# Patient Record
Sex: FEMALE | Race: WHITE | NOT HISPANIC OR LATINO | Employment: FULL TIME | URBAN - METROPOLITAN AREA
[De-identification: names, ages, dates, MRNs, and addresses within clinical notes are randomized per-mention and may not be internally consistent; named-entity substitution may affect disease eponyms.]

---

## 2018-04-30 ENCOUNTER — TRANSCRIBE ORDERS (OUTPATIENT)
Dept: ADMINISTRATIVE | Facility: HOSPITAL | Age: 48
End: 2018-04-30

## 2018-04-30 ENCOUNTER — HOSPITAL ENCOUNTER (OUTPATIENT)
Dept: RADIOLOGY | Facility: HOSPITAL | Age: 48
Discharge: HOME | End: 2018-04-30
Payer: COMMERCIAL

## 2018-04-30 DIAGNOSIS — R06.02 SHORTNESS OF BREATH: Primary | ICD-10-CM

## 2018-04-30 DIAGNOSIS — R06.02 SHORTNESS OF BREATH: ICD-10-CM

## 2018-04-30 PROCEDURE — 71046 X-RAY EXAM CHEST 2 VIEWS: CPT

## 2018-08-20 ENCOUNTER — TRANSCRIBE ORDERS (OUTPATIENT)
Dept: ADMINISTRATIVE | Facility: HOSPITAL | Age: 48
End: 2018-08-20

## 2018-08-20 DIAGNOSIS — Z12.39 SCREENING BREAST EXAMINATION: Primary | ICD-10-CM

## 2018-08-24 ENCOUNTER — HOSPITAL ENCOUNTER (OUTPATIENT)
Dept: BONE DENSITY | Facility: IMAGING CENTER | Age: 48
Discharge: HOME | End: 2018-08-24
Payer: COMMERCIAL

## 2018-08-24 DIAGNOSIS — Z12.39 SCREENING BREAST EXAMINATION: ICD-10-CM

## 2018-08-24 PROCEDURE — 77067 SCR MAMMO BI INCL CAD: CPT

## 2018-09-04 ENCOUNTER — TELEPHONE (OUTPATIENT)
Dept: MAMMOGRAPHY | Facility: CLINIC | Age: 48
End: 2018-09-04

## 2018-09-24 ENCOUNTER — HOSPITAL ENCOUNTER (OUTPATIENT)
Dept: MAMMOGRAPHY | Facility: CLINIC | Age: 48
Discharge: HOME | End: 2018-09-24
Payer: COMMERCIAL

## 2018-09-24 ENCOUNTER — HOSPITAL ENCOUNTER (OUTPATIENT)
Dept: ULTRASOUND IMAGING | Facility: CLINIC | Age: 48
Discharge: HOME | End: 2018-09-24
Payer: COMMERCIAL

## 2018-09-24 DIAGNOSIS — R92.8 ABNORMAL MAMMOGRAM: ICD-10-CM

## 2018-09-24 PROCEDURE — 77066 DX MAMMO INCL CAD BI: CPT

## 2018-09-24 PROCEDURE — G0279 TOMOSYNTHESIS, MAMMO: HCPCS

## 2018-09-24 PROCEDURE — 76642 ULTRASOUND BREAST LIMITED: CPT

## 2021-05-12 ENCOUNTER — IMMUNIZATIONS (OUTPATIENT)
Dept: FAMILY MEDICINE CLINIC | Facility: HOSPITAL | Age: 51
End: 2021-05-12

## 2021-05-12 DIAGNOSIS — Z23 ENCOUNTER FOR IMMUNIZATION: Primary | ICD-10-CM

## 2021-05-12 PROCEDURE — 0001A SARS-COV-2 / COVID-19 MRNA VACCINE (PFIZER-BIONTECH) 30 MCG: CPT

## 2021-05-12 PROCEDURE — 91300 SARS-COV-2 / COVID-19 MRNA VACCINE (PFIZER-BIONTECH) 30 MCG: CPT

## 2021-06-05 ENCOUNTER — IMMUNIZATIONS (OUTPATIENT)
Dept: FAMILY MEDICINE CLINIC | Facility: HOSPITAL | Age: 51
End: 2021-06-05

## 2021-06-05 DIAGNOSIS — Z23 ENCOUNTER FOR IMMUNIZATION: Primary | ICD-10-CM

## 2021-06-05 PROCEDURE — 91300 SARS-COV-2 / COVID-19 MRNA VACCINE (PFIZER-BIONTECH) 30 MCG: CPT | Performed by: ANESTHESIOLOGY

## 2021-06-05 PROCEDURE — 0002A SARS-COV-2 / COVID-19 MRNA VACCINE (PFIZER-BIONTECH) 30 MCG: CPT | Performed by: ANESTHESIOLOGY

## 2023-09-08 ENCOUNTER — HOSPITAL ENCOUNTER (INPATIENT)
Facility: HOSPITAL | Age: 53
LOS: 1 days | Discharge: HOME | End: 2023-09-10
Attending: EMERGENCY MEDICINE | Admitting: STUDENT IN AN ORGANIZED HEALTH CARE EDUCATION/TRAINING PROGRAM
Payer: COMMERCIAL

## 2023-09-08 ENCOUNTER — APPOINTMENT (EMERGENCY)
Dept: CT IMAGING | Facility: HOSPITAL | Age: 53
End: 2023-09-08
Payer: COMMERCIAL

## 2023-09-08 DIAGNOSIS — R65.21 SEPTIC SHOCK (HCC): Primary | ICD-10-CM

## 2023-09-08 DIAGNOSIS — R39.89 SUSPECTED UTI: ICD-10-CM

## 2023-09-08 DIAGNOSIS — A41.9 SEPTIC SHOCK (HCC): Primary | ICD-10-CM

## 2023-09-08 LAB
ALBUMIN SERPL BCP-MCNC: 4.2 G/DL (ref 3.5–5)
ALP SERPL-CCNC: 47 U/L (ref 34–104)
ALT SERPL W P-5'-P-CCNC: 24 U/L (ref 7–52)
ANION GAP SERPL CALCULATED.3IONS-SCNC: 10 MMOL/L
APTT PPP: 30 SECONDS (ref 23–37)
AST SERPL W P-5'-P-CCNC: 26 U/L (ref 13–39)
BACTERIA UR QL AUTO: ABNORMAL /HPF
BACTERIA UR QL AUTO: ABNORMAL /HPF
BASOPHILS # BLD MANUAL: 0 THOUSAND/UL (ref 0–0.1)
BASOPHILS NFR MAR MANUAL: 0 % (ref 0–1)
BILIRUB SERPL-MCNC: 0.98 MG/DL (ref 0.2–1)
BILIRUB UR QL STRIP: NEGATIVE
BILIRUB UR QL STRIP: NEGATIVE
BUN SERPL-MCNC: 15 MG/DL (ref 5–25)
CALCIUM SERPL-MCNC: 9.2 MG/DL (ref 8.4–10.2)
CHLORIDE SERPL-SCNC: 97 MMOL/L (ref 96–108)
CLARITY UR: ABNORMAL
CLARITY UR: CLEAR
CO2 SERPL-SCNC: 24 MMOL/L (ref 21–32)
COLOR UR: ABNORMAL
COLOR UR: ABNORMAL
CREAT SERPL-MCNC: 1.23 MG/DL (ref 0.6–1.3)
EOSINOPHIL # BLD MANUAL: 0 THOUSAND/UL (ref 0–0.4)
EOSINOPHIL NFR BLD MANUAL: 0 % (ref 0–6)
ERYTHROCYTE [DISTWIDTH] IN BLOOD BY AUTOMATED COUNT: 12.1 % (ref 11.6–15.1)
FLUAV RNA RESP QL NAA+PROBE: NEGATIVE
FLUBV RNA RESP QL NAA+PROBE: NEGATIVE
GFR SERPL CREATININE-BSD FRML MDRD: 49 ML/MIN/1.73SQ M
GLUCOSE SERPL-MCNC: 138 MG/DL (ref 65–140)
GLUCOSE UR STRIP-MCNC: NEGATIVE MG/DL
GLUCOSE UR STRIP-MCNC: NEGATIVE MG/DL
HCT VFR BLD AUTO: 41.2 % (ref 34.8–46.1)
HGB BLD-MCNC: 14.4 G/DL (ref 11.5–15.4)
HGB UR QL STRIP.AUTO: NEGATIVE
HGB UR QL STRIP.AUTO: NEGATIVE
HYALINE CASTS #/AREA URNS LPF: ABNORMAL /LPF
HYALINE CASTS #/AREA URNS LPF: ABNORMAL /LPF
INR PPP: 1.04 (ref 0.84–1.19)
KETONES UR STRIP-MCNC: ABNORMAL MG/DL
KETONES UR STRIP-MCNC: ABNORMAL MG/DL
LACTATE SERPL-SCNC: 4 MMOL/L (ref 0.5–2)
LEUKOCYTE ESTERASE UR QL STRIP: ABNORMAL
LEUKOCYTE ESTERASE UR QL STRIP: ABNORMAL
LYMPHOCYTES # BLD AUTO: 0 % (ref 14–44)
LYMPHOCYTES # BLD AUTO: 0 THOUSAND/UL (ref 0.6–4.47)
MCH RBC QN AUTO: 30.4 PG (ref 26.8–34.3)
MCHC RBC AUTO-ENTMCNC: 35 G/DL (ref 31.4–37.4)
MCV RBC AUTO: 87 FL (ref 82–98)
METAMYELOCYTES NFR BLD MANUAL: 1 % (ref 0–1)
MONOCYTES # BLD AUTO: 0.38 THOUSAND/UL (ref 0–1.22)
MONOCYTES NFR BLD: 3 % (ref 4–12)
MUCOUS THREADS UR QL AUTO: ABNORMAL
MUCOUS THREADS UR QL AUTO: ABNORMAL
NEUTROPHILS # BLD MANUAL: 12.03 THOUSAND/UL (ref 1.85–7.62)
NEUTS BAND NFR BLD MANUAL: 6 % (ref 0–8)
NEUTS SEG NFR BLD AUTO: 90 % (ref 43–75)
NITRITE UR QL STRIP: NEGATIVE
NITRITE UR QL STRIP: NEGATIVE
NON-SQ EPI CELLS URNS QL MICRO: ABNORMAL /HPF
NON-SQ EPI CELLS URNS QL MICRO: ABNORMAL /HPF
PH UR STRIP.AUTO: 5.5 [PH]
PH UR STRIP.AUTO: 8 [PH]
PLATELET # BLD AUTO: 202 THOUSANDS/UL (ref 149–390)
PLATELET BLD QL SMEAR: ADEQUATE
PMV BLD AUTO: 9.7 FL (ref 8.9–12.7)
POTASSIUM SERPL-SCNC: 3.6 MMOL/L (ref 3.5–5.3)
PROCALCITONIN SERPL-MCNC: 18.29 NG/ML
PROT SERPL-MCNC: 6.8 G/DL (ref 6.4–8.4)
PROT UR STRIP-MCNC: ABNORMAL MG/DL
PROT UR STRIP-MCNC: ABNORMAL MG/DL
PROTHROMBIN TIME: 14.3 SECONDS (ref 11.6–14.5)
RBC # BLD AUTO: 4.74 MILLION/UL (ref 3.81–5.12)
RBC #/AREA URNS AUTO: ABNORMAL /HPF
RBC #/AREA URNS AUTO: ABNORMAL /HPF
RBC MORPH BLD: NORMAL
RSV RNA RESP QL NAA+PROBE: NEGATIVE
SARS-COV-2 RNA RESP QL NAA+PROBE: NEGATIVE
SODIUM SERPL-SCNC: 131 MMOL/L (ref 135–147)
SP GR UR STRIP.AUTO: 1.01 (ref 1–1.03)
SP GR UR STRIP.AUTO: >=1.03 (ref 1–1.03)
UROBILINOGEN UR STRIP-ACNC: <2 MG/DL
UROBILINOGEN UR STRIP-ACNC: <2 MG/DL
WBC # BLD AUTO: 12.53 THOUSAND/UL (ref 4.31–10.16)
WBC #/AREA URNS AUTO: ABNORMAL /HPF
WBC #/AREA URNS AUTO: ABNORMAL /HPF

## 2023-09-08 PROCEDURE — 74177 CT ABD & PELVIS W/CONTRAST: CPT

## 2023-09-08 PROCEDURE — 87086 URINE CULTURE/COLONY COUNT: CPT | Performed by: PHYSICIAN ASSISTANT

## 2023-09-08 PROCEDURE — 36415 COLL VENOUS BLD VENIPUNCTURE: CPT | Performed by: EMERGENCY MEDICINE

## 2023-09-08 PROCEDURE — 96365 THER/PROPH/DIAG IV INF INIT: CPT

## 2023-09-08 PROCEDURE — 85730 THROMBOPLASTIN TIME PARTIAL: CPT | Performed by: EMERGENCY MEDICINE

## 2023-09-08 PROCEDURE — G1004 CDSM NDSC: HCPCS

## 2023-09-08 PROCEDURE — 84145 PROCALCITONIN (PCT): CPT | Performed by: EMERGENCY MEDICINE

## 2023-09-08 PROCEDURE — 83605 ASSAY OF LACTIC ACID: CPT | Performed by: EMERGENCY MEDICINE

## 2023-09-08 PROCEDURE — 87040 BLOOD CULTURE FOR BACTERIA: CPT | Performed by: EMERGENCY MEDICINE

## 2023-09-08 PROCEDURE — 0241U HB NFCT DS VIR RESP RNA 4 TRGT: CPT | Performed by: EMERGENCY MEDICINE

## 2023-09-08 PROCEDURE — 99285 EMERGENCY DEPT VISIT HI MDM: CPT

## 2023-09-08 PROCEDURE — 81001 URINALYSIS AUTO W/SCOPE: CPT | Performed by: EMERGENCY MEDICINE

## 2023-09-08 PROCEDURE — 85610 PROTHROMBIN TIME: CPT | Performed by: EMERGENCY MEDICINE

## 2023-09-08 PROCEDURE — 96368 THER/DIAG CONCURRENT INF: CPT

## 2023-09-08 PROCEDURE — 85027 COMPLETE CBC AUTOMATED: CPT | Performed by: EMERGENCY MEDICINE

## 2023-09-08 PROCEDURE — 80053 COMPREHEN METABOLIC PANEL: CPT | Performed by: EMERGENCY MEDICINE

## 2023-09-08 PROCEDURE — 85007 BL SMEAR W/DIFF WBC COUNT: CPT | Performed by: EMERGENCY MEDICINE

## 2023-09-08 PROCEDURE — 99285 EMERGENCY DEPT VISIT HI MDM: CPT | Performed by: EMERGENCY MEDICINE

## 2023-09-08 RX ORDER — SODIUM CHLORIDE, SODIUM GLUCONATE, SODIUM ACETATE, POTASSIUM CHLORIDE, MAGNESIUM CHLORIDE, SODIUM PHOSPHATE, DIBASIC, AND POTASSIUM PHOSPHATE .53; .5; .37; .037; .03; .012; .00082 G/100ML; G/100ML; G/100ML; G/100ML; G/100ML; G/100ML; G/100ML
1000 INJECTION, SOLUTION INTRAVENOUS ONCE
Status: COMPLETED | OUTPATIENT
Start: 2023-09-08 | End: 2023-09-08

## 2023-09-08 RX ORDER — CEFTRIAXONE 2 G/50ML
2000 INJECTION, SOLUTION INTRAVENOUS ONCE
Status: COMPLETED | OUTPATIENT
Start: 2023-09-08 | End: 2023-09-08

## 2023-09-08 RX ADMIN — SODIUM CHLORIDE, SODIUM GLUCONATE, SODIUM ACETATE, POTASSIUM CHLORIDE, MAGNESIUM CHLORIDE, SODIUM PHOSPHATE, DIBASIC, AND POTASSIUM PHOSPHATE 1000 ML: .53; .5; .37; .037; .03; .012; .00082 INJECTION, SOLUTION INTRAVENOUS at 22:29

## 2023-09-08 RX ADMIN — SODIUM CHLORIDE, SODIUM GLUCONATE, SODIUM ACETATE, POTASSIUM CHLORIDE, MAGNESIUM CHLORIDE, SODIUM PHOSPHATE, DIBASIC, AND POTASSIUM PHOSPHATE 1000 ML: .53; .5; .37; .037; .03; .012; .00082 INJECTION, SOLUTION INTRAVENOUS at 22:01

## 2023-09-08 RX ADMIN — CEFTRIAXONE 2000 MG: 2 INJECTION, SOLUTION INTRAVENOUS at 21:51

## 2023-09-08 RX ADMIN — IOHEXOL 100 ML: 350 INJECTION, SOLUTION INTRAVENOUS at 23:37

## 2023-09-09 ENCOUNTER — APPOINTMENT (EMERGENCY)
Dept: RADIOLOGY | Facility: HOSPITAL | Age: 53
End: 2023-09-09
Payer: COMMERCIAL

## 2023-09-09 PROBLEM — E87.1 HYPONATREMIA: Status: ACTIVE | Noted: 2023-09-09

## 2023-09-09 PROBLEM — N20.1 URETEROLITHIASIS: Status: ACTIVE | Noted: 2023-09-09

## 2023-09-09 PROBLEM — A41.9 SEPSIS (HCC): Status: ACTIVE | Noted: 2023-09-09

## 2023-09-09 PROBLEM — R65.21 SEPTIC SHOCK (HCC): Status: ACTIVE | Noted: 2023-09-09

## 2023-09-09 LAB
ALBUMIN SERPL BCP-MCNC: 3.4 G/DL (ref 3.5–5)
ALP SERPL-CCNC: 42 U/L (ref 34–104)
ALT SERPL W P-5'-P-CCNC: 30 U/L (ref 7–52)
ANION GAP SERPL CALCULATED.3IONS-SCNC: 6 MMOL/L
AST SERPL W P-5'-P-CCNC: 35 U/L (ref 13–39)
BASOPHILS # BLD MANUAL: 0 THOUSAND/UL (ref 0–0.1)
BASOPHILS NFR MAR MANUAL: 0 % (ref 0–1)
BILIRUB SERPL-MCNC: 0.8 MG/DL (ref 0.2–1)
BUN SERPL-MCNC: 12 MG/DL (ref 5–25)
CALCIUM ALBUM COR SERPL-MCNC: 8.4 MG/DL (ref 8.3–10.1)
CALCIUM SERPL-MCNC: 7.9 MG/DL (ref 8.4–10.2)
CHLORIDE SERPL-SCNC: 104 MMOL/L (ref 96–108)
CO2 SERPL-SCNC: 24 MMOL/L (ref 21–32)
CREAT SERPL-MCNC: 1 MG/DL (ref 0.6–1.3)
EOSINOPHIL # BLD MANUAL: 0 THOUSAND/UL (ref 0–0.4)
EOSINOPHIL NFR BLD MANUAL: 0 % (ref 0–6)
ERYTHROCYTE [DISTWIDTH] IN BLOOD BY AUTOMATED COUNT: 12.3 % (ref 11.6–15.1)
GFR SERPL CREATININE-BSD FRML MDRD: 64 ML/MIN/1.73SQ M
GLUCOSE SERPL-MCNC: 105 MG/DL (ref 65–140)
HCT VFR BLD AUTO: 34.5 % (ref 34.8–46.1)
HGB BLD-MCNC: 12.1 G/DL (ref 11.5–15.4)
LACTATE SERPL-SCNC: 0.7 MMOL/L (ref 0.5–2)
LACTATE SERPL-SCNC: 2.1 MMOL/L (ref 0.5–2)
LYMPHOCYTES # BLD AUTO: 0.1 THOUSAND/UL (ref 0.6–4.47)
LYMPHOCYTES # BLD AUTO: 1 % (ref 14–44)
MAGNESIUM SERPL-MCNC: 2.2 MG/DL (ref 1.9–2.7)
MCH RBC QN AUTO: 30.8 PG (ref 26.8–34.3)
MCHC RBC AUTO-ENTMCNC: 35.1 G/DL (ref 31.4–37.4)
MCV RBC AUTO: 88 FL (ref 82–98)
MONOCYTES # BLD AUTO: 0.19 THOUSAND/UL (ref 0–1.22)
MONOCYTES NFR BLD: 2 % (ref 4–12)
NEUTROPHILS # BLD MANUAL: 9.29 THOUSAND/UL (ref 1.85–7.62)
NEUTS BAND NFR BLD MANUAL: 7 % (ref 0–8)
NEUTS SEG NFR BLD AUTO: 90 % (ref 43–75)
PHOSPHATE SERPL-MCNC: 3 MG/DL (ref 2.7–4.5)
PLATELET # BLD AUTO: 173 THOUSANDS/UL (ref 149–390)
PLATELET BLD QL SMEAR: ADEQUATE
PMV BLD AUTO: 10.2 FL (ref 8.9–12.7)
POTASSIUM SERPL-SCNC: 3.6 MMOL/L (ref 3.5–5.3)
PROCALCITONIN SERPL-MCNC: 15.53 NG/ML
PROT SERPL-MCNC: 5.5 G/DL (ref 6.4–8.4)
RBC # BLD AUTO: 3.93 MILLION/UL (ref 3.81–5.12)
RBC MORPH BLD: NORMAL
SODIUM SERPL-SCNC: 134 MMOL/L (ref 135–147)
WBC # BLD AUTO: 9.58 THOUSAND/UL (ref 4.31–10.16)

## 2023-09-09 PROCEDURE — 83735 ASSAY OF MAGNESIUM: CPT | Performed by: PHYSICIAN ASSISTANT

## 2023-09-09 PROCEDURE — 71046 X-RAY EXAM CHEST 2 VIEWS: CPT

## 2023-09-09 PROCEDURE — 83605 ASSAY OF LACTIC ACID: CPT | Performed by: PHYSICIAN ASSISTANT

## 2023-09-09 PROCEDURE — 84145 PROCALCITONIN (PCT): CPT | Performed by: PHYSICIAN ASSISTANT

## 2023-09-09 PROCEDURE — 84100 ASSAY OF PHOSPHORUS: CPT | Performed by: PHYSICIAN ASSISTANT

## 2023-09-09 PROCEDURE — 85007 BL SMEAR W/DIFF WBC COUNT: CPT | Performed by: PHYSICIAN ASSISTANT

## 2023-09-09 PROCEDURE — 99222 1ST HOSP IP/OBS MODERATE 55: CPT | Performed by: INTERNAL MEDICINE

## 2023-09-09 PROCEDURE — 80053 COMPREHEN METABOLIC PANEL: CPT | Performed by: PHYSICIAN ASSISTANT

## 2023-09-09 PROCEDURE — 85027 COMPLETE CBC AUTOMATED: CPT | Performed by: PHYSICIAN ASSISTANT

## 2023-09-09 RX ORDER — ONDANSETRON 2 MG/ML
4 INJECTION INTRAMUSCULAR; INTRAVENOUS EVERY 4 HOURS PRN
Status: DISCONTINUED | OUTPATIENT
Start: 2023-09-09 | End: 2023-09-10 | Stop reason: HOSPADM

## 2023-09-09 RX ORDER — MAGNESIUM HYDROXIDE/ALUMINUM HYDROXICE/SIMETHICONE 120; 1200; 1200 MG/30ML; MG/30ML; MG/30ML
30 SUSPENSION ORAL EVERY 6 HOURS PRN
Status: DISCONTINUED | OUTPATIENT
Start: 2023-09-09 | End: 2023-09-10 | Stop reason: HOSPADM

## 2023-09-09 RX ORDER — SODIUM CHLORIDE, SODIUM GLUCONATE, SODIUM ACETATE, POTASSIUM CHLORIDE, MAGNESIUM CHLORIDE, SODIUM PHOSPHATE, DIBASIC, AND POTASSIUM PHOSPHATE .53; .5; .37; .037; .03; .012; .00082 G/100ML; G/100ML; G/100ML; G/100ML; G/100ML; G/100ML; G/100ML
125 INJECTION, SOLUTION INTRAVENOUS CONTINUOUS
Status: DISCONTINUED | OUTPATIENT
Start: 2023-09-09 | End: 2023-09-10

## 2023-09-09 RX ORDER — IBUPROFEN 400 MG/1
400 TABLET ORAL EVERY 8 HOURS PRN
Status: DISCONTINUED | OUTPATIENT
Start: 2023-09-09 | End: 2023-09-09

## 2023-09-09 RX ORDER — ACETAMINOPHEN 325 MG/1
650 TABLET ORAL EVERY 6 HOURS PRN
Status: DISCONTINUED | OUTPATIENT
Start: 2023-09-09 | End: 2023-09-10 | Stop reason: HOSPADM

## 2023-09-09 RX ORDER — ENOXAPARIN SODIUM 100 MG/ML
40 INJECTION SUBCUTANEOUS DAILY
Status: DISCONTINUED | OUTPATIENT
Start: 2023-09-09 | End: 2023-09-10 | Stop reason: HOSPADM

## 2023-09-09 RX ORDER — DOCUSATE SODIUM 100 MG/1
100 CAPSULE, LIQUID FILLED ORAL 2 TIMES DAILY PRN
Status: DISCONTINUED | OUTPATIENT
Start: 2023-09-09 | End: 2023-09-10 | Stop reason: HOSPADM

## 2023-09-09 RX ORDER — CEFTRIAXONE 1 G/50ML
1000 INJECTION, SOLUTION INTRAVENOUS EVERY 24 HOURS
Status: DISCONTINUED | OUTPATIENT
Start: 2023-09-09 | End: 2023-09-10 | Stop reason: HOSPADM

## 2023-09-09 RX ADMIN — ACETAMINOPHEN 650 MG: 325 TABLET, FILM COATED ORAL at 11:44

## 2023-09-09 RX ADMIN — SODIUM CHLORIDE, SODIUM GLUCONATE, SODIUM ACETATE, POTASSIUM CHLORIDE, MAGNESIUM CHLORIDE, SODIUM PHOSPHATE, DIBASIC, AND POTASSIUM PHOSPHATE 125 ML/HR: .53; .5; .37; .037; .03; .012; .00082 INJECTION, SOLUTION INTRAVENOUS at 17:37

## 2023-09-09 RX ADMIN — SODIUM CHLORIDE, SODIUM GLUCONATE, SODIUM ACETATE, POTASSIUM CHLORIDE, MAGNESIUM CHLORIDE, SODIUM PHOSPHATE, DIBASIC, AND POTASSIUM PHOSPHATE 125 ML/HR: .53; .5; .37; .037; .03; .012; .00082 INJECTION, SOLUTION INTRAVENOUS at 01:45

## 2023-09-09 RX ADMIN — ENOXAPARIN SODIUM 40 MG: 100 INJECTION SUBCUTANEOUS at 07:41

## 2023-09-09 RX ADMIN — ACETAMINOPHEN 650 MG: 325 TABLET, FILM COATED ORAL at 17:38

## 2023-09-09 RX ADMIN — CEFTRIAXONE 1000 MG: 1 INJECTION, SOLUTION INTRAVENOUS at 21:06

## 2023-09-09 RX ADMIN — IBUPROFEN 400 MG: 400 TABLET, FILM COATED ORAL at 07:41

## 2023-09-09 RX ADMIN — ACETAMINOPHEN 650 MG: 325 TABLET, FILM COATED ORAL at 01:42

## 2023-09-09 NOTE — PLAN OF CARE
Problem: GENITOURINARY - ADULT  Goal: Maintains or returns to baseline urinary function  Description: INTERVENTIONS:  - Assess urinary function  - Encourage oral fluids to ensure adequate hydration if ordered  - Administer IV fluids as ordered to ensure adequate hydration  - Administer ordered medications as needed  - Offer frequent toileting  - Follow urinary retention protocol if ordered  Outcome: Progressing  Goal: Absence of urinary retention  Description: INTERVENTIONS:  - Assess patient’s ability to void and empty bladder  - Monitor I/O  - Bladder scan as needed  - Discuss with physician/AP medications to alleviate retention as needed  - Discuss catheterization for long term situations as appropriate  Outcome: Progressing     Problem: PAIN - ADULT  Goal: Verbalizes/displays adequate comfort level or baseline comfort level  Description: Interventions:  - Encourage patient to monitor pain and request assistance  - Assess pain using appropriate pain scale  - Administer analgesics based on type and severity of pain and evaluate response  - Implement non-pharmacological measures as appropriate and evaluate response  - Consider cultural and social influences on pain and pain management  - Notify physician/advanced practitioner if interventions unsuccessful or patient reports new pain  Outcome: Progressing     Problem: INFECTION - ADULT  Goal: Absence or prevention of progression during hospitalization  Description: INTERVENTIONS:  - Assess and monitor for signs and symptoms of infection  - Monitor lab/diagnostic results  - Monitor all insertion sites, i.e. indwelling lines, tubes, and drains  - Monitor endotracheal if appropriate and nasal secretions for changes in amount and color  - [place] appropriate cooling/warming therapies per order  - Administer medications as ordered  - Instruct and encourage patient and family to use good hand hygiene technique  - Identify and instruct in appropriate isolation precautions for identified infection/condition  Outcome: Progressing  Goal: Absence of fever/infection during neutropenic period  Description: INTERVENTIONS:  - Monitor WBC    Outcome: Progressing     Problem: DISCHARGE PLANNING  Goal: Discharge to home or other facility with appropriate resources  Description: INTERVENTIONS:  - Identify barriers to discharge w/patient and caregiver  - Arrange for needed discharge resources and transportation as appropriate  - Identify discharge learning needs (meds, wound care, etc.)  - Arrange for interpretive services to assist at discharge as needed  - Refer to Case Management Department for coordinating discharge planning if the patient needs post-hospital services based on physician/advanced practitioner order or complex needs related to functional status, cognitive ability, or social support system  Outcome: Progressing     Problem: Knowledge Deficit  Goal: Patient/family/caregiver demonstrates understanding of disease process, treatment plan, medications, and discharge instructions  Description: Complete learning assessment and assess knowledge base.   Interventions:  - Provide teaching at level of understanding  - Provide teaching via preferred learning methods  Outcome: Progressing

## 2023-09-09 NOTE — ASSESSMENT & PLAN NOTE
· Presenting with fevers, diaphoresis, and lower abdominal and back pain  · SIRS criteria met on admission: Tachycardia and leukocytosis  · Unclear source at this time, as UA without infection, however CT A/P does show evidence of likely recently passed stone  · High suspicion for ureterolithiasis with component of infection that passed prior to CT scan as source of infection  · Procalcitonin 18.29  · Septic shock criteria met with lactic acid at 4.0 -significant improvement status post 30 cc/kg IVF bolus to 2.1  · Continue ceftriaxone  · Send urine for culture  · Blood cultures x2, pending  · If urine culture is negative and blood cultures positive, will consult ID and obtain echocardiogram to rule out endocarditis -patient without murmur on exam

## 2023-09-09 NOTE — ASSESSMENT & PLAN NOTE
· Sodium 131 on admission  · Suspect secondary to hypovolemia in setting of fevers, as well as episode of vomiting and diarrhea earlier  · Continue IV fluids

## 2023-09-09 NOTE — SEPSIS NOTE
Sepsis Note   Yuliya Faria 46 y.o. female MRN: [id number]  Unit/Bed#: -01 Encounter: [id number]          Default Flowsheet Data (last 5 hours)     Sepsis Reassess     Row Name 09/09/23 0409                   Repeat Volume Status and Tissue Perfusion Assessment Performed    Date of Reassessment: 09/09/23  -NS        Time of Reassessment: 0222 -NS        Sepsis Reassessment Note: Click "NEXT" below (NOT "close") to generate sepsis reassessment note. --        Repeat Volume Status and Tissue Perfusion Assessment Performed --              User Key  (r) = Recorded By, (t) = Taken By, (c) = Cosigned By    [address] [place] [address] Name Provider Type    NS Jaja Judd PA-C Physician Assistant                Body mass index is 23.17 kg/m². Wt Readings from Last 1 Encounters:   09/08/23 61.2 kg (135 lb)     IBW (Ideal Body Weight): 54.7 kg    Ideal body weight: 54.7 kg (120 lb 9.5 oz)  Adjusted ideal body weight: 57.3 kg (126 lb 5.7 oz)     Please see H&P from 0222 for sepsis re-assessment vitals and physical exam. q15 minute blood pressure x1 hour showed stable BP without hypotension. IVF were given due to lactic acidosis.

## 2023-09-09 NOTE — ED PROVIDER NOTES
History  Chief Complaint   Patient presents with   • Possible UTI     Pt having UTI symptoms for a few days. Pt states she has back. Pt denies fevers     79-year-old female with history of prior UTI complains of dysuria today. She has had subjective fever and chills with diaphoresis. She had brief right flank pain earlier that has resolved. She took 1 leftover Bactrim today. She used Bactrim in the past for UTI. Denies nasal congestion, sore throat, cough, abdominal pain, nausea, vomiting and diarrhea. She did give us a urine specimen but it is contaminated. Due to her tachycardia, diaphoresis, subjective fever I will have patient repeat a good midstream urine and draw sepsis labs. Prior to Admission Medications   Prescriptions Last Dose Informant Patient Reported? Taking?   loratadine-pseudoephedrine (CLARITIN-D 12-HOUR) 5-120 mg per tablet 9/8/2023  Yes Yes   Sig: Take 1 tablet by mouth 2 (two) times a day      Facility-Administered Medications: None       No past medical history on file. No past surgical history on file. No family history on file. I have reviewed and agree with the history as documented. E-Cigarette/Vaping     E-Cigarette/Vaping Substances     Social History     Tobacco Use   • Smoking status: Never   • Smokeless tobacco: Never   Substance Use Topics   • Alcohol use: Yes   • Drug use: Not Currently       Review of Systems   Constitutional: Positive for diaphoresis and fever. Negative for activity change and appetite change. HENT: Negative for congestion, ear pain, rhinorrhea and sore throat. Respiratory: Negative for cough and shortness of breath. Cardiovascular: Negative for chest pain and palpitations. Gastrointestinal: Negative for abdominal pain, blood in stool, constipation, diarrhea, nausea and vomiting. Genitourinary: Positive for difficulty urinating, dysuria and flank pain ( Transient today).  Negative for decreased urine volume, hematuria and vaginal bleeding. Musculoskeletal: Negative for myalgias. Neurological: Negative for light-headedness and headaches. Physical Exam  Physical Exam  Vitals and nursing note reviewed. Constitutional:       General: She is not in acute distress. Appearance: She is well-developed and normal weight. She is not ill-appearing or diaphoretic. HENT:      Head: Normocephalic and atraumatic. Right Ear: External ear normal.   Eyes:      Conjunctiva/sclera: Conjunctivae normal.      Pupils: Pupils are equal, round, and reactive to light. Cardiovascular:      Rate and Rhythm: Normal rate and regular rhythm. Heart sounds: No murmur heard. Pulmonary:      Effort: Pulmonary effort is normal.      Breath sounds: Normal breath sounds. Abdominal:      General: Bowel sounds are normal.      Palpations: Abdomen is soft. Musculoskeletal:         General: Normal range of motion. Cervical back: Normal range of motion and neck supple. Skin:     General: Skin is warm and dry. Findings: No rash. Neurological:      Mental Status: She is alert and oriented to person, place, and time. Cranial Nerves: No cranial nerve deficit. Coordination: Coordination normal.      Deep Tendon Reflexes: Reflexes are normal and symmetric.          Vital Signs  ED Triage Vitals   Temperature Pulse Respirations Blood Pressure SpO2   09/08/23 1929 09/08/23 1926 09/08/23 1926 09/08/23 1926 09/08/23 1926   97.8 °F (36.6 °C) (!) 112 18 113/62 98 %      Temp Source Heart Rate Source Patient Position - Orthostatic VS BP Location FiO2 (%)   09/08/23 1929 -- -- -- --   Oral          Pain Score       09/09/23 0109       6           Vitals:    09/09/23 0230 09/09/23 0709 09/09/23 0727 09/09/23 2016   BP: 102/82 (!) 80/47 99/62 103/65   Pulse: 102 94 90 82         Visual Acuity      ED Medications  Medications   multi-electrolyte (PLASMALYTE-A/ISOLYTE-S PH 7.4) IV solution (125 mL/hr Intravenous New Bag 9/9/23 9347) acetaminophen (TYLENOL) tablet 650 mg (650 mg Oral Given 9/9/23 1738)   cefTRIAXone (ROCEPHIN) IVPB (premix in dextrose) 1,000 mg 50 mL (1,000 mg Intravenous New Bag 9/9/23 2106)   aluminum-magnesium hydroxide-simethicone (MAALOX) oral suspension 30 mL (has no administration in time range)   enoxaparin (LOVENOX) subcutaneous injection 40 mg (40 mg Subcutaneous Given 9/9/23 0741)   ondansetron (ZOFRAN) injection 4 mg (has no administration in time range)   docusate sodium (COLACE) capsule 100 mg (has no administration in time range)   multi-electrolyte (PLASMALYTE-A/ISOLYTE-S PH 7.4) IV solution 1,000 mL (0 mL Intravenous Stopped 9/8/23 2257)     Followed by   multi-electrolyte (PLASMALYTE-A/ISOLYTE-S PH 7.4) IV solution 1,000 mL (0 mL Intravenous Stopped 9/8/23 2257)   cefTRIAXone (ROCEPHIN) IVPB (premix in dextrose) 2,000 mg 50 mL (0 mg Intravenous Stopped 9/8/23 2229)   iohexol (OMNIPAQUE) 350 MG/ML injection (MULTI-DOSE) 100 mL (100 mL Intravenous Given 9/8/23 2337)       Diagnostic Studies  Results Reviewed     Procedure Component Value Units Date/Time    Blood culture #1 [[id number]] Collected: 09/08/23 2052    Lab Status: Preliminary result Specimen: Blood from Arm, Right Updated: 09/09/23 0901     Blood Culture Received in Microbiology Lab. Culture in Progress. Blood culture #2 [[id number]] Collected: 09/08/23 2052    Lab Status: Preliminary result Specimen: Blood from Arm, Right Updated: 09/09/23 0901     Blood Culture Received in Microbiology Lab. Culture in Progress. Lactic acid 2 Hours [[id number]]  (Abnormal) Collected: 09/08/23 2355    Lab Status: Final result Specimen: Blood from Arm, Left Updated: 09/09/23 0024     LACTIC ACID 2.1 mmol/L     Narrative:      Result may be elevated if tourniquet was used during collection.     RBC Morphology Reflex Test [[id number]] Collected: 09/08/23 2052    Lab Status: Final result Specimen: Blood from Arm, Right Updated: 09/08/23 2201    Urine Microscopic [[id number]] (Abnormal) Collected: 09/08/23 2130    Lab Status: Final result Specimen: Urine, Clean Catch Updated: 09/08/23 2200     RBC, UA None Seen /hpf      WBC, UA None Seen /hpf      Epithelial Cells Occasional /hpf      Bacteria, UA Occasional /hpf      MUCUS THREADS Occasional     Hyaline Casts, UA 0-1 /lpf     UA w Reflex to Microscopic w Reflex to Culture [[id number]]  (Abnormal) Collected: 09/08/23 2130    Lab Status: Final result Specimen: Urine, Clean Catch Updated: 09/08/23 2200     Color, UA Dark Yellow     Clarity, UA Clear     Specific Gravity, UA >=1.030     pH, UA 5.5     Leukocytes, UA Small     Nitrite, UA Negative     Protein, UA Trace mg/dl      Glucose, UA Negative mg/dl      Ketones, UA Trace mg/dl      Urobilinogen, UA <2.0 mg/dl      Bilirubin, UA Negative     Occult Blood, UA Negative    CBC and differential [[id number]]  (Abnormal) Collected: 09/08/23 2052    Lab Status: Final result Specimen: Blood from Arm, Right Updated: 09/08/23 2150     WBC 12.53 Thousand/uL      RBC 4.74 Million/uL      Hemoglobin 14.4 g/dL      Hematocrit 41.2 %      MCV 87 fL      MCH 30.4 pg      MCHC 35.0 g/dL      RDW 12.1 %      MPV 9.7 fL      Platelets 642 Thousands/uL     Narrative: This is an appended report. These results have been appended to a previously verified report.     Manual Differential(PHLEBS Do Not Order) [[id number]]  (Abnormal) Collected: 09/08/23 2052    Lab Status: Final result Specimen: Blood from Arm, Right Updated: 09/08/23 2150     Segmented % 90 %      Bands % 6 %      Lymphocytes % 0 %      Monocytes % 3 %      Eosinophils, % 0 %      Basophils % 0 %      Metamyelocytes% 1 %      Absolute Neutrophils 12.03 Thousand/uL      Lymphocytes Absolute 0.00 Thousand/uL      Monocytes Absolute 0.38 Thousand/uL      Eosinophils Absolute 0.00 Thousand/uL      Basophils Absolute 0.00 Thousand/uL      Total Counted --     RBC Morphology Normal     Platelet Estimate Adequate    FLU/RSV/COVID - if FLU/RSV clinically relevant [[id number]]  (Normal) Collected: 09/08/23 2052    Lab Status: Final result Specimen: Nares from Nose Updated: 09/08/23 2141     SARS-CoV-2 Negative     INFLUENZA A PCR Negative     INFLUENZA B PCR Negative     RSV PCR Negative    Narrative:      FOR PEDIATRIC PATIENTS - copy/paste COVID Guidelines URL to browser: [email or web address]    SARS-CoV-2 assay is a Nucleic Acid Amplification assay intended for the  qualitative detection of nucleic acid from SARS-CoV-2 in nasopharyngeal  swabs. Results are for the presumptive identification of SARS-CoV-2 RNA. Positive results are indicative of infection with SARS-CoV-2, the virus  causing COVID-19, but do not rule out bacterial infection or co-infection  with other viruses. Laboratories within the [facility] and its  territories are required to report all positive results to the appropriate  public health authorities. Negative results do not preclude SARS-CoV-2  infection and should not be used as the sole basis for treatment or other  patient management decisions. Negative results must be combined with  clinical observations, patient history, and epidemiological information. This test has not been FDA cleared or approved. This test has been authorized by FDA under an Emergency Use Authorization  (EUA). This test is only authorized for the duration of time the  declaration that circumstances exist justifying the authorization of the  emergency use of an in vitro diagnostic tests for detection of SARS-CoV-2  virus and/or diagnosis of COVID-19 infection under section 564(b)(1) of  the Act, 21 U. S.C. 852VPN-1(O)(6), unless the authorization is terminated  or revoked sooner. The test has been validated but independent review by FDA  and CLIA is pending. Test performed using [employer] GeneXpert: This RT-PCR assay targets N2,  a region unique to SARS-CoV-2.  A conserved region in the E-gene was chosen  for pan-Sarbecovirus detection which includes SARS-CoV-2. According to CMS-2020-01-R, this platform meets the definition of high-throughput technology. Lactic acid [[id number]]  (Abnormal) Collected: 09/08/23 2052    Lab Status: Final result Specimen: Blood from Arm, Right Updated: 09/08/23 2135     LACTIC ACID 4.0 mmol/L     Narrative:      Result may be elevated if tourniquet was used during collection.     Procalcitonin [[id number]]  (Abnormal) Collected: 09/08/23 2052    Lab Status: Final result Specimen: Blood from Arm, Right Updated: 09/08/23 2131     Procalcitonin 18.29 ng/ml     Comprehensive metabolic panel [[id number]]  (Abnormal) Collected: 09/08/23 2052    Lab Status: Final result Specimen: Blood from Arm, Right Updated: 09/08/23 2120     Sodium 131 mmol/L      Potassium 3.6 mmol/L      Chloride 97 mmol/L      CO2 24 mmol/L      ANION GAP 10 mmol/L      BUN 15 mg/dL      Creatinine 1.23 mg/dL      Glucose 138 mg/dL      Calcium 9.2 mg/dL      AST 26 U/L      ALT 24 U/L      Alkaline Phosphatase 47 U/L      Total Protein 6.8 g/dL      Albumin 4.2 g/dL      Total Bilirubin 0.98 mg/dL      eGFR 49 ml/min/1.73sq m     Narrative:      [place] guidelines for Chronic Kidney Disease (CKD):   •  Stage 1 with normal or high GFR (GFR > 90 mL/min/1.73 square meters)  •  Stage 2 Mild CKD (GFR = 60-89 mL/min/1.73 square meters)  •  Stage 3A Moderate CKD (GFR = 45-59 mL/min/1.73 square meters)  •  Stage 3B Moderate CKD (GFR = 30-44 mL/min/1.73 square meters)  •  Stage 4 Severe CKD (GFR = 15-29 mL/min/1.73 square meters)  •  Stage 5 End Stage CKD (GFR <15 mL/min/1.73 square meters)  Note: GFR calculation is accurate only with a steady state creatinine    Protime-INR [[id number]]  (Normal) Collected: 09/08/23 2052    Lab Status: Final result Specimen: Blood from Arm, Right Updated: 09/08/23 2117     Protime 14.3 seconds      INR 1.04    APTT [[id number]]  (Normal) Collected: 09/08/23 2052 Lab Status: Final result Specimen: Blood from Arm, Right Updated: 09/08/23 2117     PTT 30 seconds     Urine Microscopic [[id number]]  (Abnormal) Collected: 09/08/23 1926    Lab Status: Final result Specimen: Urine, Clean Catch Updated: 09/08/23 1953     RBC, UA None Seen /hpf      WBC, UA 4-10 /hpf      Epithelial Cells Moderate /hpf      Bacteria, UA Occasional /hpf      MUCUS THREADS Occasional     Hyaline Casts, UA 0-1 /lpf     UA w Reflex to Microscopic w Reflex to Culture [[id number]]  (Abnormal) Collected: 09/08/23 1926    Lab Status: Final result Specimen: Urine, Clean Catch Updated: 09/08/23 1947     Color, UA Dark Yellow     Clarity, UA Hazy     Specific Gravity, UA 1.015     pH, UA 8.0     Leukocytes, UA Large     Nitrite, UA Negative     Protein, UA 30 (1+) mg/dl      Glucose, UA Negative mg/dl      Ketones, UA Trace mg/dl      Urobilinogen, UA <2.0 mg/dl      Bilirubin, UA Negative     Occult Blood, UA Negative                 XR chest 2 views   ED Interpretation by Eliza Faria DO (09/09 0030)   No acute cardiopulmonary disease      Final Result by Frank Short MD (09/09 7684)      No acute cardiopulmonary disease. Findings are stable               Workstation performed: [id number]         CT abdomen pelvis with contrast   Final Result by Amber Tyler MD (09/08 5575)      No acute intra-abdominal normality. No free air or free fluid. Mild fullness of both renal collecting systems and ureters with no evidence of a genitourinary tract calculus. Correlation with a urinalysis is recommended. Normal appendix visualized. Workstation performed: [id number]                    Procedures  Procedures         ED Course  ED Course as of 09/09/23 2249   Fri Sep 08, 2023   2224 Labs consistent with severe sepsis. No focal signs of infection but patient does admit to abdominal distention lately and transient flank pain today.   We will do CT abdomen pelvis Default Flowsheet Data (last 720 hours)     Sepsis Reassess     Row Name 09/09/23 0409                   Repeat Volume Status and Tissue Perfusion Assessment Performed    Date of Reassessment: 09/09/23  -NS        Time of Reassessment: 0222 -NS        Sepsis Reassessment Note: Click "NEXT" below (NOT "close") to generate sepsis reassessment note. --        Repeat Volume Status and Tissue Perfusion Assessment Performed --              User Key  (r) = Recorded By, (t) = Taken By, (c) = Cosigned By    [facility] Name Provider Type    PHIL Huffman PA-C Physician Assistant                            Medical Decision Making  59-year-old female with UTI symptoms today notes transient diaphoresis, subjective fever and chills. Had brief flank pain. Presents tachycardic. Concern for viral syndrome versus sepsis secondary to UTI. Concern also for dehydration, electrolyte imbalance, ACS. Will perform sepsis work-up. Elevated procalcitonin and white blood cells. Lactic acid is 4.  30 mL per kg fluid bolus ordered. Rocephin given for presumed UTI. No other evidence for localized infection on exam and viral testing. Benefit of Rocephin outweighs risks of rash with penicillins. Exam, labs and CT scan do not show obvious source of infection but concern for occult infection, possibly with bacteremia remains. Admission advised. Excepted by [facility] service. Amount and/or Complexity of Data Reviewed  Independent Historian: spouse  Labs: ordered. Radiology: ordered. Risk  Prescription drug management.           Disposition  Final diagnoses:   Septic shock ([address])     Time reflects when diagnosis was documented in both MDM as applicable and the Disposition within this note     Time User Action Codes Description Comment    9/9/2023 12:36 AM Samantha Dave Add [A41.9,  R65.21] Septic shock [facility])       ED Disposition     ED Disposition   Admit    Condition   Stable    Date/Time   Sat Sep 9, 2023 12:36 AM    Comment   Case was discussed with SLYSABEL AP and the patient's admission status was agreed to be Admission Status: inpatient status to the service of Dr. Amie Ambriz . Follow-up Information    None         Current Discharge Medication List      CONTINUE these medications which have NOT CHANGED    Details   loratadine-pseudoephedrine (CLARITIN-D 12-HOUR) 5-120 mg per tablet Take 1 tablet by mouth 2 (two) times a day             No discharge procedures on file.     PDMP Review     None          ED Provider  Electronically Signed by           Glynn Weaver DO  09/09/23 8208

## 2023-09-09 NOTE — ASSESSMENT & PLAN NOTE
· Reports intermittent lower abdominal and back pain over the last week with significant worsening day of admission, associated with fevers and diaphoresis  · She also developed dysuria day of admit  · CT with ": There is mild fullness of both renal collecting systems and ureters with no evidence of an obstructing ureteral calculus.  The findings may be secondary to a recently passed stone."  · Suspect pain and symptoms were secondary to renal stone that was possibly acutely infected that had spontaneously passed prior to arrival to the ED

## 2023-09-09 NOTE — UTILIZATION REVIEW
Initial Clinical Review    Admission: Date/Time/Statement:   Admission Orders (From admission, onward)     Ordered        09/09/23 0037  INPATIENT ADMISSION  Once                      Orders Placed This Encounter   Procedures   • INPATIENT ADMISSION     Standing Status:   Standing     Number of Occurrences:   1     Order Specific Question:   Level of Care     Answer:   Med Surg [16]     Order Specific Question:   Estimated length of stay     Answer:   More than 2 Midnights     Order Specific Question:   Certification     Answer:   I certify that inpatient services are medically necessary for this patient for a duration of greater than two midnights. See H&P and MD Progress Notes for additional information about the patient's course of treatment. ED Arrival Information     Expected   -    Arrival   9/8/2023 19:09    Acuity   Urgent            Means of arrival   Walk-In    Escorted by   Spouse    Service   Hospitalist    Admission type   Emergency            Arrival complaint   UTI            Chief Complaint   Patient presents with   • Possible UTI     Pt having UTI symptoms for a few days. Pt states she has back. Pt denies fevers       Initial Presentation: 46 y.o. female to ED via walk-in from home  Present to ED with lower abdominal pain and back pain for the past week. She developed dysuria today, as well as subjective fevers and diaphoresis; endorses 2 episodes of nausea and diarrhea this AM  PMHX no significant   Admitted to [id number] [facility] with DX: Septic shock   on exam: Tachy and leukocytosis; Procalcitonin 18.29; lactic acid at 4.0  CT A/P does show evidence of likely recently passed stone  PLAN: cont iv abx; cont ivf; f/u blood / urine cx; monitor labs; pain / nausea control      Date: 9/10/23     Day 2  reports ongoing 10/10 right arm pain and numbness. Exam - there is slight edema to bilateral hands, which are nonpitting. There is no erythema.  She reports significant pain to slight touch of the wrist overlying the radial and ulnar arteries, as well as light touch to all of her fingers. No paresthesias or pain with percussion over the carpal tunnel. Decreased ROM of wrist and all fingers secondary to pain. Radial and ulnar pulses are 2+. Sensation intact throughout the right hand. Plan: cont iv abx; f/u blood / urine cx; monitor labs; pain / nausea control    ED Triage Vitals   Temperature Pulse Respirations Blood Pressure SpO2   09/08/23 1929 09/08/23 1926 09/08/23 1926 09/08/23 1926 09/08/23 1926   97.8 °F (36.6 °C) (!) 112 18 113/62 98 %      Temp Source Heart Rate Source Patient Position - Orthostatic VS BP Location FiO2 (%)   09/08/23 1929 -- -- -- --   Oral          Pain Score       09/09/23 0109       6          Wt Readings from Last 1 Encounters:   09/08/23 61.2 kg (135 lb)     Additional Vital Signs:   Date/Time Temp Pulse Resp BP MAP (mmHg) SpO2   09/09/23 07:27:24 -- 90 -- 99/62 74 100 %   09/09/23 07:09:27 -- 94 15 80/47 Abnormal  58 Abnormal  100 %   09/09/23 0230 -- 102 -- 102/82 89 99 %   09/09/23 0215 -- 91 -- 96/56 69 100 %   09/09/23 0200 -- 91 -- 92/60 71 100 %   09/09/23 0145 -- 95 -- 91/63 72 100 %   09/09/23 00:57:42 -- 96 -- 96/59 71 98 %   09/08/23 2300 -- 93 -- 101/60 76 100 %   09/08/23 2245 -- 85 -- 95/61 73 100 %   09/08/23 2230 -- 90 -- 107/62 79 100 %   09/08/23 2215 -- 90 -- 104/61 77 100 %   09/08/23 2202 -- 90 -- 105/64 77 100 %   09/08/23 1929 97.8 °F (36.6 °C) -- -- -- -- --   09/08/23 1926 -- 112 Abnormal  18 113/62 -- 98 %         EKG: No EKG obtained. Pertinent Labs/Diagnostic Test Results:   XR chest 2 views   ED Interpretation by Marika Gonzalez DO (09/09 0030)   No acute cardiopulmonary disease      Final Result by Nanda Durán MD (09/09 3219)      No acute cardiopulmonary disease.       Findings are stable               Workstation performed: [id number]         CT abdomen pelvis with contrast   Final Result by Suzy Mayen MD (09/08 1386)      No acute intra-abdominal normality. No free air or free fluid. Mild fullness of both renal collecting systems and ureters with no evidence of a genitourinary tract calculus. Correlation with a urinalysis is recommended. Normal appendix visualized.             Workstation performed: [id number]           Results from last 7 days   Lab Units 09/08/23 2052   SARS-COV-2  Negative     Results from last 7 days   Lab Units 09/09/23 0341 09/08/23 2052   WBC Thousand/uL 9.58 12.53*   HEMOGLOBIN g/dL 12.1 14.4   HEMATOCRIT % 34.5* 41.2   PLATELETS Thousands/uL 173 202   BANDS PCT % 7 6         Results from last 7 days   Lab Units 09/09/23 0341 09/08/23 2052   SODIUM mmol/L 134* 131*   POTASSIUM mmol/L 3.6 3.6   CHLORIDE mmol/L 104 97   CO2 mmol/L 24 24   ANION GAP mmol/L 6 10   BUN mg/dL 12 15   CREATININE mg/dL 1.00 1.23   EGFR ml/min/1.73sq m 64 49   CALCIUM mg/dL 7.9* 9.2   MAGNESIUM mg/dL 2.2  --    PHOSPHORUS mg/dL 3.0  --      Results from last 7 days   Lab Units 09/09/23 0341 09/08/23 2052   AST U/L 35 26   ALT U/L 30 24   ALK PHOS U/L 42 47   TOTAL PROTEIN g/dL 5.5* 6.8   ALBUMIN g/dL 3.4* 4.2   TOTAL BILIRUBIN mg/dL 0.80 0.98         Results from last 7 days   Lab Units 09/09/23 0341 09/08/23 2052   GLUCOSE RANDOM mg/dL 105 138       Results from last 7 days   Lab Units 09/08/23 2052   PROTIME seconds 14.3   INR  1.04   PTT seconds 30         Results from last 7 days   Lab Units 09/09/23 0341 09/08/23 2052   PROCALCITONIN ng/ml 15.53* 18.29*     Results from last 7 days   Lab Units 09/09/23 0341 09/08/23 2355 09/08/23 2052   LACTIC ACID mmol/L 0.7 2.1* 4.0*       Results from last 7 days   Lab Units 09/08/23  2130 09/08/23  1926   CLARITY UA  Clear Hazy   COLOR UA  Dark Yellow Dark Yellow   SPEC GRAV UA  >=1.030 1.015   PH UA  5.5 8.0   GLUCOSE UA mg/dl Negative Negative   KETONES UA mg/dl Trace* Trace*   BLOOD UA  Negative Negative   PROTEIN UA mg/dl Trace* 30 (1+)*   NITRITE UA  Negative Negative BILIRUBIN UA  Negative Negative   UROBILINOGEN UA (BE) mg/dl <2.0 <2.0   LEUKOCYTES UA  Small* Large*   WBC UA /hpf None Seen 4-10*   RBC UA /hpf None Seen None Seen   BACTERIA UA /hpf Occasional Occasional   EPITHELIAL CELLS WET PREP /hpf Occasional Moderate*   MUCUS THREADS  Occasional* Occasional*     Results from last 7 days   Lab Units 09/08/23 2052   INFLUENZA A PCR  Negative   INFLUENZA B PCR  Negative   RSV PCR  Negative       Results from last 7 days   Lab Units 09/08/23 2052   BLOOD CULTURE  Received in Microbiology Lab. Culture in Progress. Received in Microbiology Lab. Culture in Progress.        ED Treatment:   Medication Administration from 09/08/2023 1908 to 09/09/2023 0053       Date/Time Order Dose Route Action     09/08/2023 2201 EDT multi-electrolyte (PLASMALYTE-A/ISOLYTE-S PH 7.4) IV solution 1,000 mL 1,000 mL Intravenous New Bag     09/08/2023 2229 EDT multi-electrolyte (PLASMALYTE-A/ISOLYTE-S PH 7.4) IV solution 1,000 mL 1,000 mL Intravenous New Bag     09/08/2023 2151 EDT cefTRIAXone (ROCEPHIN) IVPB (premix in dextrose) 2,000 mg 50 mL 2,000 mg Intravenous New Bag     09/08/2023 2337 EDT iohexol (OMNIPAQUE) 350 MG/ML injection (MULTI-DOSE) 100 mL 100 mL Intravenous Given        Admitting Diagnosis: Septic shock (HCC) [A41.9, R65.21]  UTI symptoms [R39.9]     Age/Sex: 46 y.o. female     Admission Orders:  Ambulatory; regular diet    Scheduled Medications:  cefTRIAXone, 1,000 mg, Intravenous, Q24H  enoxaparin, 40 mg, Subcutaneous, Daily      Continuous IV Infusions:  multi-electrolyte, 125 mL/hr, Intravenous, Continuous      PRN Meds:  acetaminophen, 650 mg, Oral, Q6H PRN  (9/9 rec'd x3)  (9/10 rec'd x1 so far today)   aluminum-magnesium hydroxide-simethicone, 30 mL, Oral, Q6H PRN  ondansetron, 4 mg, Intravenous, Q4H PRN  ibuprofen (MOTRIN) tablet 400 mg Oral, Q8H PRN   (9/9 rec'd x1)           Network Utilization Review Department  ATTENTION: Please call with any questions or concerns to [phone] and carefully listen to the prompts so that you are directed to the right person. All voicemails are confidential.  Alexi Coello all requests for admission clinical reviews, approved or denied determinations and any other requests to dedicated fax number below belonging to the campus where the patient is receiving treatment.  List of dedicated fax numbers for the Facilities:  [place] DENIALS (Administrative/Medical Necessity) [phone] [address] [facility] (Maternity/NICU/Pediatrics) [phone]   [address] [place] [address] [phone]   [facility] [address] [facility] [phone]   [address] [place] [address] 207 [facility] [address] [place], [place] [id number] [facility] [phone]   [address] [address] [phone]

## 2023-09-09 NOTE — H&P
[facility]  H&P  Name: Cynthia Latham 46 y.o. female I MRN: [id number]  Unit/Bed#: -01 I Date of Admission: 9/8/2023   Date of Service: 9/9/2023 I Hospital Day: 0      Assessment/Plan   * Septic shock ([address])  Assessment & Plan  · Presenting with fevers, diaphoresis, and lower abdominal and back pain  · SIRS criteria met on admission: Tachycardia and leukocytosis  · Unclear source at this time, as UA without infection, however CT A/P does show evidence of likely recently passed stone  · High suspicion for ureterolithiasis with component of infection that passed prior to CT scan as source of infection  · Procalcitonin 18.29  · Septic shock criteria met with lactic acid at 4.0 -significant improvement status post 30 cc/kg IVF bolus to 2.1  · Continue ceftriaxone  · Send urine for culture  · Blood cultures x2, pending  · If urine culture is negative and blood cultures positive, will consult ID and obtain echocardiogram to rule out endocarditis -patient without murmur on exam    Suspected ureterolithiasis  Assessment & Plan  · Reports intermittent lower abdominal and back pain over the last week with significant worsening day of admission, associated with fevers and diaphoresis  · She also developed dysuria day of admit  · CT with ": There is mild fullness of both renal collecting systems and ureters with no evidence of an obstructing ureteral calculus.  The findings may be secondary to a recently passed stone."  · Suspect pain and symptoms were secondary to renal stone that was possibly acutely infected that had spontaneously passed prior to arrival to the ED    Hyponatremia  Assessment & Plan  · Sodium 131 on admission  · Suspect secondary to hypovolemia in setting of fevers, as well as episode of vomiting and diarrhea earlier  · Continue IV fluids       VTE Pharmacologic Prophylaxis: VTE Score: 3 Moderate Risk (Score 3-4) - Pharmacological DVT Prophylaxis Ordered: enoxaparin (Lovenox). Code Status: Level 1 - Full Code   Discussion with family: Patient declined call to . Anticipated Length of Stay: Patient will be admitted on an inpatient basis with an anticipated length of stay of greater than 2 midnights secondary to Septic shock, suspected ureterolithiasis. Total Time Spent on Date of Encounter in care of patient: 65 minutes This time was spent on one or more of the following: performing physical exam; counseling and coordination of care; obtaining or reviewing history; documenting in the medical record; reviewing/ordering tests, medications or procedures; communicating with other healthcare professionals and discussing with patient's family/caregivers. Chief Complaint: " I have been having pain"    History of Present Illness:  Jacki Hurd is a 46 y.o. female with no significant past medical history who presents with lower abdominal pain and back pain for the past week. She developed dysuria today, as well as subjective fevers and diaphoresis, which prompted her to come to the ED. Also endorses 2 episodes of nausea, one with small-volume NBNB emesis and one with dry heaves. Also endorses episode of diarrhea in the morning after nausea. Reports pain is intermittent nature. No sick contacts. Review of Systems:  Review of Systems   Constitutional: Positive for diaphoresis and fever. HENT: Negative for congestion. Respiratory: Positive for shortness of breath. Negative for cough. Cardiovascular: Negative for leg swelling. Gastrointestinal: Positive for abdominal pain, diarrhea, nausea and vomiting. Negative for blood in stool and constipation. Genitourinary: Positive for dysuria. Negative for difficulty urinating and hematuria. Musculoskeletal: Positive for back pain. Neurological: Negative for weakness. All other systems reviewed and are negative. Past Medical and Surgical History:   No past medical history on file.     No past surgical history on file. Meds/Allergies:  Prior to Admission medications    Medication Sig Start Date End Date Taking? Authorizing Provider   loratadine-pseudoephedrine (CLARITIN-D 12-HOUR) 5-120 mg per tablet Take 1 tablet by mouth 2 (two) times a day   Yes Historical Provider, MD STEPHEN have reviewed home medications with patient personally. Allergies: Allergies   Allergen Reactions   • Bactrim [Sulfamethoxazole-Trimethoprim] Rash   • Penicillins Rash       Social History:  Marital Status: /Civil Union   Occupation: Unemployed  Patient Pre-hospital Living Situation: Home, With spouse, With other family member: 5 kids  Patient Pre-hospital Level of Mobility: walks  Patient Pre-hospital Diet Restrictions: None  Substance Use History:   Social History     Substance and Sexual Activity   Alcohol Use Yes     Social History     Tobacco Use   Smoking Status Never   Smokeless Tobacco Never     Social History     Substance and Sexual Activity   Drug Use Not Currently       Family History:  No family history on file. Physical Exam:     Vitals:   Blood Pressure: 102/82 (09/09/23 0230)  Pulse: 102 (09/09/23 0230)  Temperature: 97.8 °F (36.6 °C) (09/08/23 1929)  Temp Source: Oral (09/08/23 1929)  Respirations: 18 (09/08/23 1926)  Height: 5' 4" (162.6 cm) (09/08/23 1927)  Weight - Scale: 61.2 kg (135 lb) (09/08/23 1927)  SpO2: 99 % (09/09/23 0230)    Physical Exam  Vitals and nursing note reviewed. Constitutional:       General: She is not in acute distress. Appearance: Normal appearance. She is not ill-appearing. Comments: Pleasant and conversational.  Sitting in bed. Ambulated to the bathroom without assistance. HENT:      Head: Normocephalic. Nose: Nose normal.      Mouth/Throat:      Mouth: Mucous membranes are moist.   Eyes:      Extraocular Movements: Extraocular movements intact.       Conjunctiva/sclera: Conjunctivae normal.   Cardiovascular:      Rate and Rhythm: Normal rate and regular rhythm. Pulses: Normal pulses. Radial pulses are 2+ on the right side and 2+ on the left side. Dorsalis pedis pulses are 2+ on the right side and 2+ on the left side. Heart sounds: No murmur heard. Pulmonary:      Effort: Pulmonary effort is normal. No respiratory distress. Breath sounds: Normal breath sounds. No wheezing, rhonchi or rales. Abdominal:      General: Abdomen is flat. There is no distension. Palpations: Abdomen is soft. Tenderness: There is no abdominal tenderness. There is no guarding or rebound. Musculoskeletal:         General: Normal range of motion. Cervical back: Normal range of motion. Right lower leg: No edema. Left lower leg: No edema. Skin:     General: Skin is warm and dry. Coloration: Skin is not pale. Neurological:      General: No focal deficit present. Mental Status: She is alert and oriented to person, place, and time. Psychiatric:         Mood and Affect: Mood normal.         Thought Content:  Thought content normal.          Additional Data:     Lab Results:  Results from last 7 days   Lab Units 09/09/23 0341 09/08/23 2052   WBC Thousand/uL 9.58 12.53*   HEMOGLOBIN g/dL 12.1 14.4   HEMATOCRIT % 34.5* 41.2   PLATELETS Thousands/uL 173 202   BANDS PCT %  --  6   LYMPHO PCT %  --  0*   MONO PCT %  --  3*   EOS PCT %  --  0     Results from last 7 days   Lab Units 09/08/23 2052   SODIUM mmol/L 131*   POTASSIUM mmol/L 3.6   CHLORIDE mmol/L 97   CO2 mmol/L 24   BUN mg/dL 15   CREATININE mg/dL 1.23   ANION GAP mmol/L 10   CALCIUM mg/dL 9.2   ALBUMIN g/dL 4.2   TOTAL BILIRUBIN mg/dL 0.98   ALK PHOS U/L 47   ALT U/L 24   AST U/L 26   GLUCOSE RANDOM mg/dL 138     Results from last 7 days   Lab Units 09/08/23 2052   INR  1.04             Results from last 7 days   Lab Units 09/09/23 0341 09/08/23 2355 09/08/23 2052   LACTIC ACID mmol/L 0.7 2.1* 4.0*   PROCALCITONIN ng/ml  --   --  18.29* Lines/Drains:  Invasive Devices     Peripheral Intravenous Line  Duration           Peripheral IV 09/09/23 Distal;Left;Ventral (anterior) Forearm <1 day                    Imaging: Reviewed radiology reports from this admission including: abdominal/pelvic CT  XR chest 2 views   ED Interpretation by Regla Garcia DO (09/09 0030)   No acute cardiopulmonary disease      CT abdomen pelvis with contrast   Final Result by Morris Fuchs MD (09/08 8477)      No acute intra-abdominal normality. No free air or free fluid. Mild fullness of both renal collecting systems and ureters with no evidence of a genitourinary tract calculus. Correlation with a urinalysis is recommended. Normal appendix visualized. Workstation performed: [id number]             EKG and Other Studies Reviewed on Admission:   · EKG: No EKG obtained. ** Please Note: This note has been constructed using a voice recognition system.  **

## 2023-09-09 NOTE — PLAN OF CARE
Problem: GENITOURINARY - ADULT  Goal: Maintains or returns to baseline urinary function  Description: INTERVENTIONS:  - Assess urinary function  - Encourage oral fluids to ensure adequate hydration if ordered  - Administer IV fluids as ordered to ensure adequate hydration  - Administer ordered medications as needed  - Offer frequent toileting  - Follow urinary retention protocol if ordered  Outcome: Progressing  Goal: Absence of urinary retention  Description: INTERVENTIONS:  - Assess patient’s ability to void and empty bladder  - Monitor I/O  - Bladder scan as needed  - Discuss with physician/AP medications to alleviate retention as needed  - Discuss catheterization for long term situations as appropriate  Outcome: Progressing     Problem: PAIN - ADULT  Goal: Verbalizes/displays adequate comfort level or baseline comfort level  Description: Interventions:  - Encourage patient to monitor pain and request assistance  - Assess pain using appropriate pain scale  - Administer analgesics based on type and severity of pain and evaluate response  - Implement non-pharmacological measures as appropriate and evaluate response  - Consider cultural and social influences on pain and pain management  - Notify physician/advanced practitioner if interventions unsuccessful or patient reports new pain  Outcome: Progressing     Problem: INFECTION - ADULT  Goal: Absence or prevention of progression during hospitalization  Description: INTERVENTIONS:  - Assess and monitor for signs and symptoms of infection  - Monitor lab/diagnostic results  - Monitor all insertion sites, i.e. indwelling lines, tubes, and drains  - Monitor endotracheal if appropriate and nasal secretions for changes in amount and color  - [place] appropriate cooling/warming therapies per order  - Administer medications as ordered  - Instruct and encourage patient and family to use good hand hygiene technique  - Identify and instruct in appropriate isolation precautions for identified infection/condition  Outcome: Progressing

## 2023-09-10 VITALS
OXYGEN SATURATION: 99 % | HEART RATE: 84 BPM | DIASTOLIC BLOOD PRESSURE: 83 MMHG | RESPIRATION RATE: 12 BRPM | WEIGHT: 135 LBS | TEMPERATURE: 97.2 F | SYSTOLIC BLOOD PRESSURE: 122 MMHG | HEIGHT: 64 IN | BODY MASS INDEX: 23.05 KG/M2

## 2023-09-10 LAB
ANION GAP SERPL CALCULATED.3IONS-SCNC: 5 MMOL/L
BACTERIA UR CULT: NORMAL
BASOPHILS # BLD AUTO: 0.01 THOUSANDS/ÂΜL (ref 0–0.1)
BASOPHILS NFR BLD AUTO: 0 % (ref 0–1)
BUN SERPL-MCNC: 8 MG/DL (ref 5–25)
CALCIUM SERPL-MCNC: 7.7 MG/DL (ref 8.4–10.2)
CHLORIDE SERPL-SCNC: 111 MMOL/L (ref 96–108)
CO2 SERPL-SCNC: 22 MMOL/L (ref 21–32)
CREAT SERPL-MCNC: 0.69 MG/DL (ref 0.6–1.3)
EOSINOPHIL # BLD AUTO: 0.22 THOUSAND/ÂΜL (ref 0–0.61)
EOSINOPHIL NFR BLD AUTO: 8 % (ref 0–6)
ERYTHROCYTE [DISTWIDTH] IN BLOOD BY AUTOMATED COUNT: 12.8 % (ref 11.6–15.1)
ERYTHROCYTE [DISTWIDTH] IN BLOOD BY AUTOMATED COUNT: 13 % (ref 11.6–15.1)
GFR SERPL CREATININE-BSD FRML MDRD: 99 ML/MIN/1.73SQ M
GLUCOSE SERPL-MCNC: 101 MG/DL (ref 65–140)
HCT VFR BLD AUTO: 31.8 % (ref 34.8–46.1)
HCT VFR BLD AUTO: 32.5 % (ref 34.8–46.1)
HGB BLD-MCNC: 10.6 G/DL (ref 11.5–15.4)
HGB BLD-MCNC: 10.9 G/DL (ref 11.5–15.4)
IMM GRANULOCYTES # BLD AUTO: 0.02 THOUSAND/UL (ref 0–0.2)
IMM GRANULOCYTES NFR BLD AUTO: 1 % (ref 0–2)
LYMPHOCYTES # BLD AUTO: 0.58 THOUSANDS/ÂΜL (ref 0.6–4.47)
LYMPHOCYTES NFR BLD AUTO: 20 % (ref 14–44)
MCH RBC QN AUTO: 30.2 PG (ref 26.8–34.3)
MCH RBC QN AUTO: 30.5 PG (ref 26.8–34.3)
MCHC RBC AUTO-ENTMCNC: 33.3 G/DL (ref 31.4–37.4)
MCHC RBC AUTO-ENTMCNC: 33.5 G/DL (ref 31.4–37.4)
MCV RBC AUTO: 90 FL (ref 82–98)
MCV RBC AUTO: 91 FL (ref 82–98)
MONOCYTES # BLD AUTO: 0.19 THOUSAND/ÂΜL (ref 0.17–1.22)
MONOCYTES NFR BLD AUTO: 7 % (ref 4–12)
NEUTROPHILS # BLD AUTO: 1.83 THOUSANDS/ÂΜL (ref 1.85–7.62)
NEUTS SEG NFR BLD AUTO: 64 % (ref 43–75)
NRBC BLD AUTO-RTO: 0 /100 WBCS
PLATELET # BLD AUTO: 133 THOUSANDS/UL (ref 149–390)
PLATELET # BLD AUTO: 139 THOUSANDS/UL (ref 149–390)
PMV BLD AUTO: 10.8 FL (ref 8.9–12.7)
PMV BLD AUTO: 10.9 FL (ref 8.9–12.7)
POTASSIUM SERPL-SCNC: 4.1 MMOL/L (ref 3.5–5.3)
PROCALCITONIN SERPL-MCNC: 6.78 NG/ML
RBC # BLD AUTO: 3.48 MILLION/UL (ref 3.81–5.12)
RBC # BLD AUTO: 3.61 MILLION/UL (ref 3.81–5.12)
SODIUM SERPL-SCNC: 138 MMOL/L (ref 135–147)
WBC # BLD AUTO: 2.84 THOUSAND/UL (ref 4.31–10.16)
WBC # BLD AUTO: 2.85 THOUSAND/UL (ref 4.31–10.16)

## 2023-09-10 PROCEDURE — 84145 PROCALCITONIN (PCT): CPT | Performed by: PHYSICIAN ASSISTANT

## 2023-09-10 PROCEDURE — 85025 COMPLETE CBC W/AUTO DIFF WBC: CPT | Performed by: INTERNAL MEDICINE

## 2023-09-10 PROCEDURE — 85027 COMPLETE CBC AUTOMATED: CPT | Performed by: PHYSICIAN ASSISTANT

## 2023-09-10 PROCEDURE — 80048 BASIC METABOLIC PNL TOTAL CA: CPT | Performed by: INTERNAL MEDICINE

## 2023-09-10 PROCEDURE — 99239 HOSP IP/OBS DSCHRG MGMT >30: CPT | Performed by: INTERNAL MEDICINE

## 2023-09-10 RX ORDER — CEFDINIR 300 MG/1
300 CAPSULE ORAL EVERY 12 HOURS SCHEDULED
Qty: 6 CAPSULE | Refills: 0 | Status: SHIPPED | OUTPATIENT
Start: 2023-09-10 | End: 2023-09-13

## 2023-09-10 RX ADMIN — ACETAMINOPHEN 650 MG: 325 TABLET, FILM COATED ORAL at 01:14

## 2023-09-10 RX ADMIN — SODIUM CHLORIDE, SODIUM GLUCONATE, SODIUM ACETATE, POTASSIUM CHLORIDE, MAGNESIUM CHLORIDE, SODIUM PHOSPHATE, DIBASIC, AND POTASSIUM PHOSPHATE 125 ML/HR: .53; .5; .37; .037; .03; .012; .00082 INJECTION, SOLUTION INTRAVENOUS at 02:01

## 2023-09-10 NOTE — PLAN OF CARE
Problem: GENITOURINARY - ADULT  Goal: Maintains or returns to baseline urinary function  Description: INTERVENTIONS:  - Assess urinary function  - Encourage oral fluids to ensure adequate hydration if ordered  - Administer IV fluids as ordered to ensure adequate hydration  - Administer ordered medications as needed  - Offer frequent toileting  - Follow urinary retention protocol if ordered  Outcome: Progressing  Goal: Absence of urinary retention  Description: INTERVENTIONS:  - Assess patient’s ability to void and empty bladder  - Monitor I/O  - Bladder scan as needed  - Discuss with physician/AP medications to alleviate retention as needed  - Discuss catheterization for long term situations as appropriate  Outcome: Progressing     Problem: PAIN - ADULT  Goal: Verbalizes/displays adequate comfort level or baseline comfort level  Description: Interventions:  - Encourage patient to monitor pain and request assistance  - Assess pain using appropriate pain scale  - Administer analgesics based on type and severity of pain and evaluate response  - Implement non-pharmacological measures as appropriate and evaluate response  - Consider cultural and social influences on pain and pain management  - Notify physician/advanced practitioner if interventions unsuccessful or patient reports new pain  Outcome: Progressing     Problem: INFECTION - ADULT  Goal: Absence or prevention of progression during hospitalization  Description: INTERVENTIONS:  - Assess and monitor for signs and symptoms of infection  - Monitor lab/diagnostic results  - Monitor all insertion sites, i.e. indwelling lines, tubes, and drains  - Monitor endotracheal if appropriate and nasal secretions for changes in amount and color  - [place] appropriate cooling/warming therapies per order  - Administer medications as ordered  - Instruct and encourage patient and family to use good hand hygiene technique  - Identify and instruct in appropriate isolation precautions for identified infection/condition  Outcome: Progressing  Goal: Absence of fever/infection during neutropenic period  Description: INTERVENTIONS:  - Monitor WBC    Outcome: Progressing     Problem: DISCHARGE PLANNING  Goal: Discharge to home or other facility with appropriate resources  Description: INTERVENTIONS:  - Identify barriers to discharge w/patient and caregiver  - Arrange for needed discharge resources and transportation as appropriate  - Identify discharge learning needs (meds, wound care, etc.)  - Arrange for interpretive services to assist at discharge as needed  - Refer to Case Management Department for coordinating discharge planning if the patient needs post-hospital services based on physician/advanced practitioner order or complex needs related to functional status, cognitive ability, or social support system  Outcome: Progressing     Problem: Knowledge Deficit  Goal: Patient/family/caregiver demonstrates understanding of disease process, treatment plan, medications, and discharge instructions  Description: Complete learning assessment and assess knowledge base.   Interventions:  - Provide teaching at level of understanding  - Provide teaching via preferred learning methods  Outcome: Progressing

## 2023-09-10 NOTE — QUICK NOTE
Asked to evaluate the patient as she reports ongoing 10/10 right arm pain and numbness. Seen and examined at bedside. Reports symptoms onset after IV fluids were started in IV in her right arm that was previously there. States pain and numbness is intermittent. She describes it as sharp and radiating from her wrist throughout her entire arm. Reports both hands look swollen to her and she has difficulty flexing and extending her right wrist, as well as opening and closing her right hand. She states when her IVF were started in her new IV in her left arm yesterday morning, she felt left hand numbness, but held it above her heart and it resolved without recurrence. She reports she has been unable to sleep for the last 2 night due to the pain, pain improved during the day due to walking around and exercising per patient. On exam -there is slight edema to bilateral hands, which are nonpitting. There is no erythema. She has no bony tenderness in the shoulder or elbow with full range of motion at each joint. She reports significant pain to slight touch of the wrist overlying the radial and ulnar arteries, as well as light touch to all of her fingers. No paresthesias or pain with percussion over the carpal tunnel. Decreased ROM of wrist and all fingers secondary to pain. Radial and ulnar pulses are 2+. Sensation intact throughout the right hand. Offered to obtain XR of R hand and elbow, as well as inflammatory markers (ESR and CRP), however pt wishes to just stop the IVF at this time and see if her pain improves. If symptoms fail to improve with stopping IVF, would obtain ESR, CRP, and XR. Could also consider echo as pt did present with sepsis without definitive source, though high suspicion for urinary source with recently passed stone - urine culture still pending. Blood cultures also still pending.      Also offered low dose oxycodone or gabapentin to help with pain but pt wished to stop IVF first.

## 2023-09-10 NOTE — UTILIZATION REVIEW
NOTIFICATION OF INPATIENT ADMISSION   AUTHORIZATION REQUEST   SERVICING FACILITY:   [address] [place] [address]  [address] [facility],Third Floor [address]  Tax ID: [id number]  NPI: [id number] ATTENDING PROVIDER:  Attending Name and NPI#: Clay Mar Md [[id number]]  Address: [facility] [address] [facility],Third [address] [address]  Phone: [phone]   ADMISSION INFORMATION:  Place of Service: [address] [place] [address] Service Code: 21  Inpatient Admission Date/Time: 9/9/23 12:37 AM  Discharge Date/Time: 9/10/2023 10:03 AM  Admitting Diagnosis Code/Description:  Septic shock ([address]) [A41.9, R65.21]  UTI symptoms [R39.9]     UTILIZATION REVIEW CONTACT:  Ruth Ann Sierra Utilization   Network Utilization Review Department  Phone: [phone]  Fax [phone]  Email: [email or web address]  Contact for approvals/pending authorizations, clinical reviews, and discharge. PHYSICIAN ADVISORY SERVICES:  Medical Necessity Denial & Ywju-br-Yqjt Review  Phone: [phone]  Fax: [phone]  Email: [email or web address]

## 2023-09-10 NOTE — DISCHARGE SUMMARY
Discharge Summary - [facility] Internal Medicine  Patient: Cynthia Latham 46 y.o. female   MRN: [id number]  PCP: Lesley Gomez DO  Unit/Bed#: [facility]01 Encounter: [id number]            Discharging Physician / Practitioner: Dina Leo MD  PCP: Lesley Gomez DO  Admission Date:   Admission Orders (From admission, onward)     Ordered        09/09/23 0037  INPATIENT ADMISSION  Once                      Discharge Date: 09/10/23      Reason for Admission:  Lower abdominal/back pain      Discharge Diagnoses:     Principal Problem:    Severe sepsis on admission    Active Problems:    Suspected ureterolithiasis with UTI      Resolved Problems:    Hyponatremia    Right arm swelling      Consultations During Hospital Stay:  · None      Hospital Course:     Cynthia Latham is a 46 y.o. female patient who originally presented to the hospital on 9/8/2023 due to complaints of lower abdominal/back discomfort over several days prior to presentation. CT imaging revealed evidence of fullness of the renal collecting systems without evidence of an ongoing calculus. She met severe sepsis criteria on admission including leukocytosis coupled with tachycardia, lactic acidosis, and hypotension, for which she was given IV fluid boluses and aggressive hydration. Blood cultures remain negative at time of discharge. Urinalysis initially revealed evidence of pyuria but was negative for nitrites or bacteriuria. The work-up revealed suspicion for a possibly passed ureteral stone and residual UTI. A subsequent urine culture, however, was undetectable for large growth organisms. Her IV Rocephin regimen was transitioned over to an oral Omnicef course to complete postdischarge. Transiently through the hospital stay, she complained of some distal right arm swelling with extension to the hand around the area where her initial IV line was placed. The IV line was removed and a different line was placed in the contralateral arm.   The pain worsened a bit, however, once IV fluids were stopped, she reported near resolution of both pain and swelling. She denied need for any further work-up due to improvement in symptoms. Her presenting hyponatremia also normalized with IV fluid hydration. Overall, she reports significant improvement in symptoms, and is agreeable to discharge. She will follow-up with her PCP as instructed. Condition at Discharge: fair       Discharge Day Visit / Exam:     Vitals:  Blood Pressure: 122/83 (09/10/23 0807)  Pulse: 84 (09/10/23 0807)  Temperature: (!) 97.2 °F (36.2 °C) (09/09/23 2016)  Temp Source: Temporal (09/09/23 2016)  Respirations: 12 (09/10/23 0807)  Height: 5' 4" (162.6 cm) (09/08/23 1927)  Weight - Scale: 61.2 kg (135 lb) (09/08/23 1927)  SpO2: 99 % (09/10/23 0807)      Physical exam:  I had a face-to-face encounter with the patient on day of discharge. Discussion with Patient and/or Family:  The patient has been advised to return to the ER immediately if any symptoms recur or worsen. Discharge instructions/Information to Patient and/or Family:   See after visit summary for information provided to patient and/or family. Provisions for Follow-Up Care:  See after visit summary for information related to follow-up care and any pertinent home health orders. Disposition:   Home      Discharge Medications:  See after visit summary for reconciled discharge medications provided to patient and/or family. Discharge Statement:  I spent 38 minutes discharging the patient. This time was spent on the day of discharge. I had direct contact with the patient on the day of discharge. Greater than 50% of the total time was spent examining patient, answering all patient questions, arranging and discussing plan of care with patient as well as directly providing post-discharge instructions. Additional time then spent on discharge activities.              ** Please Note: This note is constructed using a voice recognition dictation system. An occasional wrong word/phrase or “sound-a-like” substitution may have been picked up by dictation device due to the inherent limitations of voice recognition software. Read the chart carefully and recognize, using reasonable context, where substitutions may have occurred. **

## 2023-09-11 NOTE — UTILIZATION REVIEW
NOTIFICATION OF INPATIENT ADMISSION   AUTHORIZATION REQUEST   SERVICING FACILITY:   [address] [place] [address]  [address] [facility],Third Floor [address]  Tax ID: [id number]  NPI: [id number] ATTENDING PROVIDER:  Attending Name and NPI#: Gayathri Penn Md [[id number]]  Address: [facility] [address] [facility],Third Floor [address]  Phone: [phone]   ADMISSION INFORMATION:  Place of Service: [address] [place] [address] Service Code: 21  Inpatient Admission Date/Time: 9/9/23 12:37 AM  Discharge Date/Time: 9/10/2023 10:03 AM  Admitting Diagnosis Code/Description:  Septic shock ([address]) [A41.9, R65.21]  UTI symptoms [R39.9]     UTILIZATION REVIEW CONTACT:  Aaron Perez Utilization   Network Utilization Review Department  Phone: [phone]  Fax [phone]  Email: [email or web address]  Contact for approvals/pending authorizations, clinical reviews, and discharge. PHYSICIAN ADVISORY SERVICES:  Medical Necessity Denial & Veck-ml-Ohbx Review  Phone: [phone]  Fax: [phone]  Email: [email or web address]

## 2023-09-13 NOTE — UTILIZATION REVIEW
NOTIFICATION OF ADMISSION DISCHARGE   This is a Notification of Discharge from [facility]. Please be advised that this patient has been discharge from our facility. Below you will find the admission and discharge date and time including the patient’s disposition. UTILIZATION REVIEW CONTACT:  Weston Will  Utilization   Network Utilization Review Department  Phone: [phone] carefully listen to the prompts. All voicemails are confidential.  Email: [email or web address]     ADMISSION INFORMATION  PRESENTATION DATE: 9/8/2023  8:22 PM  OBERVATION ADMISSION DATE:  INPATIENT ADMISSION DATE: 9/9/23 12:37 AM   DISCHARGE DATE: 9/10/2023 10:03 AM   DISPOSITION:Home/Self Care    IMPORTANT INFORMATION:  Send all requests for admission clinical reviews, approved or denied determinations and any other requests to dedicated fax number below belonging to the campus where the patient is receiving treatment.  List of dedicated fax numbers:  [place] DENIAL[facility] (Administrative/Medical Necessity) [phone] [address] [facility] [place] (Maternity/NICU/Pediatrics) [phone]   [facility] [phone]   [facility] [phone]   [facility] [phone]   [address] [place] [address] [phone]   [facility] [phone]   [address] [place] [address] [address] [place] [address] [phone]   [address] [place] [address] [phone] [address] [facility] [phone] [address] [facility] [address] [address] [phone]

## 2023-09-14 LAB
BACTERIA BLD CULT: NORMAL
BACTERIA BLD CULT: NORMAL